# Patient Record
Sex: FEMALE | Race: ASIAN | NOT HISPANIC OR LATINO
[De-identification: names, ages, dates, MRNs, and addresses within clinical notes are randomized per-mention and may not be internally consistent; named-entity substitution may affect disease eponyms.]

---

## 2018-04-27 ENCOUNTER — APPOINTMENT (OUTPATIENT)
Dept: OPHTHALMOLOGY | Facility: CLINIC | Age: 23
End: 2018-04-27

## 2018-04-27 ENCOUNTER — OUTPATIENT (OUTPATIENT)
Dept: OUTPATIENT SERVICES | Facility: HOSPITAL | Age: 23
LOS: 1 days | End: 2018-04-27

## 2018-06-21 ENCOUNTER — RESULT REVIEW (OUTPATIENT)
Age: 23
End: 2018-06-21

## 2018-09-13 DIAGNOSIS — H17.811 MINOR OPACITY OF CORNEA, RIGHT EYE: ICD-10-CM

## 2019-01-29 ENCOUNTER — MEDICATION RENEWAL (OUTPATIENT)
Age: 24
End: 2019-01-29

## 2019-01-29 ENCOUNTER — APPOINTMENT (OUTPATIENT)
Dept: OTOLARYNGOLOGY | Facility: CLINIC | Age: 24
End: 2019-01-29
Payer: COMMERCIAL

## 2019-01-29 VITALS
WEIGHT: 141 LBS | SYSTOLIC BLOOD PRESSURE: 111 MMHG | DIASTOLIC BLOOD PRESSURE: 67 MMHG | HEART RATE: 89 BPM | HEIGHT: 62 IN | BODY MASS INDEX: 25.95 KG/M2

## 2019-01-29 DIAGNOSIS — H65.91 UNSPECIFIED NONSUPPURATIVE OTITIS MEDIA, RIGHT EAR: ICD-10-CM

## 2019-01-29 DIAGNOSIS — R09.81 NASAL CONGESTION: ICD-10-CM

## 2019-01-29 PROCEDURE — 31231 NASAL ENDOSCOPY DX: CPT

## 2019-01-29 PROCEDURE — 99204 OFFICE O/P NEW MOD 45 MIN: CPT | Mod: 25

## 2019-01-29 RX ORDER — AZITHROMYCIN 250 MG/1
250 TABLET, FILM COATED ORAL
Qty: 1 | Refills: 3 | Status: ACTIVE | COMMUNITY
Start: 2019-01-29 | End: 1900-01-01

## 2019-01-29 RX ORDER — METHYLPREDNISOLONE 4 MG/1
4 TABLET ORAL
Qty: 1 | Refills: 0 | Status: ACTIVE | COMMUNITY
Start: 2019-01-29 | End: 1900-01-01

## 2019-01-29 RX ORDER — FLUTICASONE PROPIONATE 50 UG/1
50 SPRAY, METERED NASAL DAILY
Qty: 1 | Refills: 3 | Status: ACTIVE | COMMUNITY
Start: 2019-01-29 | End: 1900-01-01

## 2019-01-29 RX ORDER — LEVOTHYROXINE SODIUM 0.07 MG/1
75 TABLET ORAL
Refills: 0 | Status: ACTIVE | COMMUNITY

## 2019-01-29 RX ORDER — AZELASTINE HYDROCHLORIDE 137 UG/1
0.1 SPRAY, METERED NASAL DAILY
Qty: 1 | Refills: 0 | Status: ACTIVE | COMMUNITY
Start: 2019-01-29 | End: 1900-01-01

## 2019-01-29 NOTE — PHYSICAL EXAM
[Nasal Endoscopy Performed] : nasal endoscopy was performed, see procedure section for findings [Midline] : trachea located in midline position [Normal] : no rashes [de-identified] : serous fluid on the right  [de-identified] : hypertrophic bilaterally

## 2019-01-29 NOTE — HISTORY OF PRESENT ILLNESS
[de-identified] : Patient had a cold for the last few months and states that since her cold went away she has still had swollen tonsils. SHe statse that prior to this illness she never had tonsil issues. SHe does not have a history of throat infections. SHe has pain with swallowing bilaterally and this pain radiates to the ears and neck. She has not had any antibitoics or steroids. SHe does have ear pressure bilaterally worse on the right.

## 2019-01-29 NOTE — ASSESSMENT
[FreeTextEntry1] : Patient 38 a [facility] [employer] [facility] had been upper respiratory tract infection dissecting and some persistent clot feeling of her ears and enlarged tonsils. On examination she has 2 fairly large exophytic tonsils ear examination shows fluid in the right ear nasal endoscopy show no evidence of any tumors masses or power a lot of secretions. I put her on a Medrol Dosepak based nasal spray as well as Zithromax fully expecting her symptoms to resolve she'll follow up and see us in a week if the fluid were to persist and continues to give her problems consideration for tube be given at that time.

## 2019-02-05 ENCOUNTER — APPOINTMENT (OUTPATIENT)
Dept: OTOLARYNGOLOGY | Facility: CLINIC | Age: 24
End: 2019-02-05
Payer: COMMERCIAL

## 2019-02-05 VITALS
HEART RATE: 74 BPM | DIASTOLIC BLOOD PRESSURE: 72 MMHG | WEIGHT: 141 LBS | BODY MASS INDEX: 25.95 KG/M2 | HEIGHT: 62 IN | SYSTOLIC BLOOD PRESSURE: 109 MMHG

## 2019-02-05 DIAGNOSIS — J35.1 HYPERTROPHY OF TONSILS: ICD-10-CM

## 2019-02-05 DIAGNOSIS — H69.83 OTHER SPECIFIED DISORDERS OF EUSTACHIAN TUBE, BILATERAL: ICD-10-CM

## 2019-02-05 PROCEDURE — 99214 OFFICE O/P EST MOD 30 MIN: CPT

## 2019-02-05 RX ORDER — METHYLPREDNISOLONE 4 MG/1
4 TABLET ORAL
Qty: 1 | Refills: 0 | Status: ACTIVE | COMMUNITY
Start: 2019-02-05 | End: 1900-01-01

## 2019-02-05 NOTE — ASSESSMENT
[FreeTextEntry1] : Patient follows he was feeling much better still some very minor fluid in the right ear persists her tonsils are still enlarged but getting smaller she is reassured she knows what to do\par She flies to minimize pressure in her ears and will followup with us as needed.

## 2019-02-05 NOTE — HISTORY OF PRESENT ILLNESS
[de-identified] : PAtient had fluid during the last visit in her ears and couldn’t hear. Today she feels much better and no longer has any issues with hearing. SHe mckenzie snot have any pain in the ears or pressure. SHe did the steroids and had no issues. SHe mckenzie snot have any throat pain right now and feels like she is swallowig better now. She cotninues to use the nasal sprays daily

## 2019-02-05 NOTE — PHYSICAL EXAM
[Nasal Endoscopy Performed] : nasal endoscopy was performed, see procedure section for findings [Midline] : trachea located in midline position [Normal] : no rashes [de-identified] : minor hypertrophy

## 2019-02-19 ENCOUNTER — APPOINTMENT (OUTPATIENT)
Dept: OTOLARYNGOLOGY | Facility: CLINIC | Age: 24
End: 2019-02-19

## 2025-05-24 ENCOUNTER — EMERGENCY (EMERGENCY)
Facility: HOSPITAL | Age: 30
LOS: 1 days | End: 2025-05-24
Attending: STUDENT IN AN ORGANIZED HEALTH CARE EDUCATION/TRAINING PROGRAM
Payer: COMMERCIAL

## 2025-05-24 VITALS
HEART RATE: 80 BPM | OXYGEN SATURATION: 100 % | SYSTOLIC BLOOD PRESSURE: 138 MMHG | RESPIRATION RATE: 16 BRPM | DIASTOLIC BLOOD PRESSURE: 79 MMHG

## 2025-05-24 VITALS
SYSTOLIC BLOOD PRESSURE: 112 MMHG | RESPIRATION RATE: 18 BRPM | HEART RATE: 96 BPM | HEIGHT: 62 IN | WEIGHT: 175.05 LBS | TEMPERATURE: 98 F | OXYGEN SATURATION: 98 % | DIASTOLIC BLOOD PRESSURE: 79 MMHG

## 2025-05-24 LAB
BASOPHILS # BLD AUTO: 0.02 K/UL — SIGNIFICANT CHANGE UP (ref 0–0.2)
BASOPHILS NFR BLD AUTO: 0.1 % — SIGNIFICANT CHANGE UP (ref 0–2)
EOSINOPHIL # BLD AUTO: 0.34 K/UL — SIGNIFICANT CHANGE UP (ref 0–0.5)
EOSINOPHIL NFR BLD AUTO: 2.5 % — SIGNIFICANT CHANGE UP (ref 0–6)
HCG SERPL-ACNC: <2 MIU/ML — SIGNIFICANT CHANGE UP
HCT VFR BLD CALC: 46.4 % — HIGH (ref 34.5–45)
HGB BLD-MCNC: 15.8 G/DL — HIGH (ref 11.5–15.5)
IMM GRANULOCYTES NFR BLD AUTO: 0.4 % — SIGNIFICANT CHANGE UP (ref 0–0.9)
LYMPHOCYTES # BLD AUTO: 14.5 % — SIGNIFICANT CHANGE UP (ref 13–44)
LYMPHOCYTES # BLD AUTO: 2.01 K/UL — SIGNIFICANT CHANGE UP (ref 1–3.3)
MAGNESIUM SERPL-MCNC: 1.9 MG/DL — SIGNIFICANT CHANGE UP (ref 1.6–2.6)
MCHC RBC-ENTMCNC: 30 PG — SIGNIFICANT CHANGE UP (ref 27–34)
MCHC RBC-ENTMCNC: 34.1 G/DL — SIGNIFICANT CHANGE UP (ref 32–36)
MCV RBC AUTO: 88 FL — SIGNIFICANT CHANGE UP (ref 80–100)
MONOCYTES # BLD AUTO: 1.4 K/UL — HIGH (ref 0–0.9)
MONOCYTES NFR BLD AUTO: 10.1 % — SIGNIFICANT CHANGE UP (ref 2–14)
NEUTROPHILS # BLD AUTO: 10.03 K/UL — HIGH (ref 1.8–7.4)
NEUTROPHILS NFR BLD AUTO: 72.4 % — SIGNIFICANT CHANGE UP (ref 43–77)
NRBC BLD AUTO-RTO: 0 /100 WBCS — SIGNIFICANT CHANGE UP (ref 0–0)
PHOSPHATE SERPL-MCNC: 2.6 MG/DL — SIGNIFICANT CHANGE UP (ref 2.5–4.5)
PLATELET # BLD AUTO: 440 K/UL — HIGH (ref 150–400)
RBC # BLD: 5.27 M/UL — HIGH (ref 3.8–5.2)
RBC # FLD: 12.5 % — SIGNIFICANT CHANGE UP (ref 10.3–14.5)
WBC # BLD: 13.86 K/UL — HIGH (ref 3.8–10.5)
WBC # FLD AUTO: 13.86 K/UL — HIGH (ref 3.8–10.5)

## 2025-05-24 PROCEDURE — 85025 COMPLETE CBC W/AUTO DIFF WBC: CPT

## 2025-05-24 PROCEDURE — 84702 CHORIONIC GONADOTROPIN TEST: CPT

## 2025-05-24 PROCEDURE — 96374 THER/PROPH/DIAG INJ IV PUSH: CPT

## 2025-05-24 PROCEDURE — 96375 TX/PRO/DX INJ NEW DRUG ADDON: CPT

## 2025-05-24 PROCEDURE — 83735 ASSAY OF MAGNESIUM: CPT

## 2025-05-24 PROCEDURE — 99284 EMERGENCY DEPT VISIT MOD MDM: CPT | Mod: 25

## 2025-05-24 PROCEDURE — 99284 EMERGENCY DEPT VISIT MOD MDM: CPT

## 2025-05-24 PROCEDURE — 80053 COMPREHEN METABOLIC PANEL: CPT

## 2025-05-24 PROCEDURE — 84100 ASSAY OF PHOSPHORUS: CPT

## 2025-05-24 RX ORDER — ONDANSETRON HCL/PF 4 MG/2 ML
4 VIAL (ML) INJECTION ONCE
Refills: 0 | Status: COMPLETED | OUTPATIENT
Start: 2025-05-24 | End: 2025-05-24

## 2025-05-24 RX ORDER — MAG HYDROX/ALUMINUM HYD/SIMETH 400-400-40
10 SUSPENSION, ORAL (FINAL DOSE FORM) ORAL
Qty: 210 | Refills: 0
Start: 2025-05-24 | End: 2025-05-30

## 2025-05-24 RX ORDER — ONDANSETRON HCL/PF 4 MG/2 ML
1 VIAL (ML) INJECTION
Qty: 21 | Refills: 0
Start: 2025-05-24 | End: 2025-05-30

## 2025-05-24 RX ADMIN — Medication 20 MILLIGRAM(S): at 17:04

## 2025-05-24 RX ADMIN — Medication 1000 MILLILITER(S): at 17:58

## 2025-05-24 RX ADMIN — Medication 1000 MILLILITER(S): at 17:06

## 2025-05-24 RX ADMIN — Medication 4 MILLIGRAM(S): at 17:54

## 2025-05-24 NOTE — ED PROVIDER NOTE - CLINICAL SUMMARY MEDICAL DECISION MAKING FREE TEXT BOX
29-year-old female with past medical history of hypothyroid presents to emergency department for evaluation of loose nonbloody stools x 6 days.  Associated abdominal cramping that is relieved by bowel movement.  Over the past 2 to 3 days, has developed nonbloody, but bilious emesis. Given persistance of symptoms and decreased PO intake, will evaluate for electrolyte derrangement. Will give IV fluids, Pepcid. Abdomen non tender, low suspicion for surgical abdominal pathology. If lab work is abnormal, patient with elevated LFTs or t bili, consider RUQ ultrasound.

## 2025-05-24 NOTE — ED ADULT NURSE REASSESSMENT NOTE - NS ED NURSE REASSESS COMMENT FT1
Report received from Luz GAVIN. Pt AXOX4 breathing unlabored on room air and speaking in complete sentences. Pt updated on plan of care; awaiting DC. Safety and comfort measures maintained. Bed in lowest position. Bed rails in appropriate positions. Call bell within reach.

## 2025-05-24 NOTE — ED PROVIDER NOTE - ATTENDING CONTRIBUTION TO CARE
29-year-old female with past medical history of hypothyroidism presents with 5 days of nausea, vomiting and diarrhea. Reports no fever, chills. Denies hematemesis and hematochezia. She states that she has no known sick contacts or out of the country travel    PE: well appearing, nontoxic, no respiratory distress.  Abdomen soft nontender, nondistended, No pitting edema. Neuro nonfocal.  Skin intact. Psych normal mood.    MDM: Differential diagnosis includes but is not limited to gastroenteritis, viral syndrome, colitis, GERD, gastritis, cholelithiasis, cholecystitis   WIll assess with labs and provide pepcid and IVF

## 2025-05-24 NOTE — ED PROVIDER NOTE - OBJECTIVE STATEMENT
29-year-old female with past medical history of hypothyroid presents to emergency department for evaluation of loose nonbloody stools x 6 days.  Associated abdominal cramping that is relieved by bowel movement.  Over the past 2 to 3 days, has developed nonbloody, but bilious emesis.  Has been taking Imodium, Zofran and trying to drink fluids however immediately has a bowel movement or emesis following trialing p.o.  Recent travel to [place] earlier in the week before symptoms began, but family that traveled with her is asymptomatic.  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  Had a normal uncomplicated delivery in January of this year, with return of her menses. NKDA. Denies urinary symptoms or back pain.

## 2025-05-24 NOTE — ED PROVIDER NOTE - PROGRESS NOTE DETAILS
Attending Jeri Chatman MD: I discussed results with patient   Reports improvement in symptoms will dc with pepcid, zofran, and mylanta   Advised PCP follow up

## 2025-05-24 NOTE — ED ADULT NURSE NOTE - OBJECTIVE STATEMENT
Female 29 years old with past medical history of Hypothyroidism came in for nausea, vomiting and diarrhea for 5 days. Pt came back form [place] Monday and following day she has vomiting and diarrhea associated with abdominal cramping. Denies fever, chills, or urinary symptoms. Denies blood in diarrhea. Last vomiting this morning and diarrhea at 1400 this afternoon. Denies chest pain or sob, denies sick contact. Will monitor. Safety maintained.

## 2025-05-24 NOTE — ED PROVIDER NOTE - PATIENT PORTAL LINK FT
You can access the FollowMyHealth Patient Portal offered by [facility] by registering at the following website: [email or web address]. By joining [facility]’s FollowMyHealth portal, you will also be able to view your health information using other applications (apps) compatible with our system.

## 2025-05-24 NOTE — ED PROVIDER NOTE - NSFOLLOWUPINSTRUCTIONS_ED_ALL_ED_FT
Today you were seen in the ER for evaluation of your symptoms.     Your lab work is below.     Please follow up with your primary care doctor.     Zofran and Pepcid were sent to your pharmacy.     SEEK IMMEDIATE MEDICAL CARE IF YOU HAVE ANY OF THE FOLLOWING SYMPTOMS: fever, inability to keep sufficient fluids down, black or bloody vomitus, black or bloody stools, lightheadedness/dizziness, chest pain, severe headache, rash, shortness of breath, cold or clammy skin, confusion, pain with urination, or any signs of dehydration.

## 2025-05-24 NOTE — ED PROVIDER NOTE - PHYSICAL EXAMINATION
Gen: well appearing, in no acute distress   Head: normal appearing  HEENT: normal conjunctiva, oral mucosa moist, vision grossly intact   Lung: no respiratory distress, speaking in full sentences, CTA b/l, no wheeze, crackles or rhonchi   CV: regular rate and rhythm, no murmurs  Abd: soft, non distended, non tender, no guarding   MSK: no visible deformities, ambulating without difficulty   Neuro: No focal deficits, AAOx3  Skin: Warm, no rashes   Psych: normal affect

## 2025-05-24 NOTE — ED ADULT NURSE NOTE - NSFALLUNIVINTERV_ED_ALL_ED
Bed/Stretcher in lowest position, wheels locked, appropriate side rails in place/Call bell, personal items and telephone in reach/Instruct patient to call for assistance before getting out of bed/chair/stretcher/Non-slip footwear applied when patient is off stretcher/[place] to call system/Physically safe environment - no spills, clutter or unnecessary equipment/Purposeful proactive rounding/Room/bathroom lighting operational, light cord in reach